# Patient Record
Sex: MALE | URBAN - METROPOLITAN AREA
[De-identification: names, ages, dates, MRNs, and addresses within clinical notes are randomized per-mention and may not be internally consistent; named-entity substitution may affect disease eponyms.]

---

## 2024-10-29 RX ORDER — CHLORHEXIDINE GLUCONATE 40 MG/ML
1 SOLUTION TOPICAL DAILY
Refills: 0 | Status: DISCONTINUED | OUTPATIENT
Start: 2024-10-30 | End: 2024-10-30

## 2024-10-29 NOTE — ASU PATIENT PROFILE, ADULT - NS PREOP UNDERSTANDS INFO
Time given by MD, NPO/NO solid foods after 12MN,  , water till  2 am  , dress comfortable, no lotion,   Jewelry  . Bring ID photo and insurance cards, escort arranged,  address and  telephone given/yes

## 2024-10-30 ENCOUNTER — OUTPATIENT (OUTPATIENT)
Dept: OUTPATIENT SERVICES | Facility: HOSPITAL | Age: 34
LOS: 1 days | Discharge: HOME | End: 2024-10-30

## 2024-10-30 VITALS
OXYGEN SATURATION: 99 % | HEART RATE: 69 BPM | RESPIRATION RATE: 16 BRPM | WEIGHT: 176.15 LBS | DIASTOLIC BLOOD PRESSURE: 68 MMHG | SYSTOLIC BLOOD PRESSURE: 125 MMHG | HEIGHT: 71 IN | TEMPERATURE: 98 F

## 2024-10-30 VITALS
RESPIRATION RATE: 16 BRPM | HEART RATE: 67 BPM | SYSTOLIC BLOOD PRESSURE: 106 MMHG | DIASTOLIC BLOOD PRESSURE: 61 MMHG | TEMPERATURE: 98 F | OXYGEN SATURATION: 100 %

## 2024-10-30 DEVICE — ANCHOR SUT FIBERSTITCH CRVD 1.5MM: Type: IMPLANTABLE DEVICE | Site: RIGHT | Status: FUNCTIONAL

## 2024-10-30 DEVICE — IMP FIBERSTITCH 24 DEG CURVE: Type: IMPLANTABLE DEVICE | Site: RIGHT | Status: FUNCTIONAL

## 2024-10-30 DEVICE — ANCHOR SUT FIBERSTITCH 24 DEG 1.5MM: Type: IMPLANTABLE DEVICE | Site: RIGHT | Status: FUNCTIONAL

## 2024-10-30 RX ORDER — EMTRICITABINE AND TENOFOVIR DISOPROXIL FUMARATE 200; 300 MG/1; MG/1
1 TABLET, FILM COATED ORAL
Refills: 0 | DISCHARGE

## 2024-10-30 RX ORDER — FENTANYL CITRAT/DEXTROSE 5%/PF 1250MCG/50
25 PATIENT CONTROLLED ANALGESIA SYRINGE INTRAVENOUS
Refills: 0 | Status: DISCONTINUED | OUTPATIENT
Start: 2024-10-30 | End: 2024-10-30

## 2024-10-30 RX ORDER — ACETAMINOPHEN 500 MG
1000 TABLET ORAL ONCE
Refills: 0 | Status: COMPLETED | OUTPATIENT
Start: 2024-10-30 | End: 2024-10-30

## 2024-10-30 RX ORDER — APREPITANT 40 MG/1
40 CAPSULE ORAL ONCE
Refills: 0 | Status: COMPLETED | OUTPATIENT
Start: 2024-10-30 | End: 2024-10-30

## 2024-10-30 RX ADMIN — Medication 25 MICROGRAM(S): at 18:47

## 2024-10-30 RX ADMIN — Medication 100 MILLILITER(S): at 19:45

## 2024-10-30 RX ADMIN — CHLORHEXIDINE GLUCONATE 1 APPLICATION(S): 40 SOLUTION TOPICAL at 12:29

## 2024-10-30 RX ADMIN — APREPITANT 40 MILLIGRAM(S): 40 CAPSULE ORAL at 12:28

## 2024-10-30 RX ADMIN — Medication 100 MILLILITER(S): at 19:47

## 2024-10-30 RX ADMIN — Medication 25 MICROGRAM(S): at 19:02

## 2024-10-30 RX ADMIN — Medication 1000 MILLIGRAM(S): at 12:29

## 2024-10-30 NOTE — ASU DISCHARGE PLAN (ADULT/PEDIATRIC) - CARE PROVIDER_API CALL
Titi Dasilva; MBBS)  Orthopaedic Surgery  [address]  [place] [address]  Phone: [phone]  Fax: [phone]  Follow Up Time:

## 2024-10-30 NOTE — ASU DISCHARGE PLAN (ADULT/PEDIATRIC) - NS MD DC FALL RISK RISK
For information on Fall & Injury Prevention, visit: [email or web address] OR  [email or web address] OR  https://www.cdc.gov/steadi/patient.html

## 2024-10-30 NOTE — ASU DISCHARGE PLAN (ADULT/PEDIATRIC) - FINANCIAL ASSISTANCE
[facility] provides services at a reduced cost to those who are determined to be eligible through [facility]’s financial assistance program. Information regarding [facility]’s financial assistance program can be found by going to [email or web address] or by calling [phone].

## 2024-10-30 NOTE — ASU DISCHARGE PLAN (ADULT/PEDIATRIC) - ASU DC SPECIAL INSTRUCTIONSFT
Weight bearing as tolerated right leg, utilize crutches  Keep knee brace on  Elevate extremity for pain/swelling.  Take pain medications as prescribed as needed, wean off as tolerated, hold for sedation.  You may take over the counter stool softeners or laxatives while on opiates as they may make you constipated.  Take antibiotics as prescribed.  Take Aspirin 325mg twice a day to prevent blood clots.  Follow up with Dr Dasilva as instructed, if you have any questions please call his office. Toe touch weight bearing right leg, utilize crutches  Keep knee brace on  Elevate extremity for pain/swelling.  Take pain medications as prescribed as needed, wean off as tolerated, hold for sedation.  You may take over the counter stool softeners or laxatives while on opiates as they may make you constipated.  Take antibiotics as prescribed.  Take Aspirin 325mg twice a day to prevent blood clots.  Follow up with Dr Dasilva as instructed, if you have any questions please call his office.

## 2024-10-30 NOTE — PRE-ANESTHESIA EVALUATION ADULT - NSANTHOSAYNRD_GEN_A_CORE
No. STONEY screening performed.  STOP BANG Legend: 0-2 = LOW Risk; 3-4 = INTERMEDIATE Risk; 5-8 = HIGH Risk

## 2024-10-30 NOTE — PRE-ANESTHESIA EVALUATION ADULT - NSANTHADDINFOFT_GEN_ALL_CORE
GA / PNB for postop pain  R/B/A discussed with patient including risks of parasthesia and nerve injury. All questions answered.

## 2024-10-30 NOTE — BRIEF OPERATIVE NOTE - FIRST ASSIST
Occupational Therapy                 Therapy Communication Note    Patient Name: Estuardo El  MRN: [id number]  Today's Date: 8/15/2024     Discipline: Occupational Therapy    Missed Visit Reason: Missed Visit Reason: Other (Comment) (Pt currently off floor for EEG.)    Missed Time: Attempt at 0923    Comment:   GUILHERME/Other...

## (undated) DEVICE — DRSG COBAN 3" LF NONSTERILE

## (undated) DEVICE — DRSG STOCKINETTE IMPERVIOUS XL

## (undated) DEVICE — SOL IRR BAG NS 0.9% 3000ML

## (undated) DEVICE — VENODYNE/SCD SLEEVE CALF MEDIUM

## (undated) DEVICE — ARTHREX PORTAL SKID DISP

## (undated) DEVICE — PACK KNEE ARTHROSCOPY

## (undated) DEVICE — SHAVER BLADE S&N FULL RADIUS 3.5MM STRAIGHT (BEIGE)

## (undated) DEVICE — GLV 7.5 PROTEXIS (WHITE)

## (undated) DEVICE — TOURNIQUET CUFF 34" DUAL PORT W PLC

## (undated) DEVICE — ARTHREX KIT ACL TRANSTIBIAL W SAW BLADE

## (undated) DEVICE — KNOT PUSHER WITH PORTAL SKID

## (undated) DEVICE — ARTHREX ANGEL CPRP & BMA TRAY

## (undated) DEVICE — ARTHREX UNIVERS APEX SUTURE KIT

## (undated) DEVICE — DRSG KLING 4"

## (undated) DEVICE — S&N ARTHROCARE WAND COBLATION WEREWOLF FLOW 90

## (undated) DEVICE — SUT MONOCRYL PLUS 3-0 18" PS-2 UNDYED

## (undated) DEVICE — SHAVER BLADE S&N FULL RADIUS BONECUTTER PLATINUM 4.5MM (YELLOW)

## (undated) DEVICE — POSITIONER FOAM EGG CRATE ULNAR 2PCS (PINK)

## (undated) DEVICE — ARTHREX ARTHROSCOPY TUBING

## (undated) DEVICE — SUT TENSIONER CUTTER 2-0/0